# Patient Record
Sex: MALE | Race: WHITE | NOT HISPANIC OR LATINO | Employment: OTHER | URBAN - METROPOLITAN AREA
[De-identification: names, ages, dates, MRNs, and addresses within clinical notes are randomized per-mention and may not be internally consistent; named-entity substitution may affect disease eponyms.]

---

## 2018-12-29 ENCOUNTER — APPOINTMENT (EMERGENCY)
Dept: CT IMAGING | Facility: HOSPITAL | Age: 73
End: 2018-12-29
Payer: COMMERCIAL

## 2018-12-29 ENCOUNTER — HOSPITAL ENCOUNTER (EMERGENCY)
Facility: HOSPITAL | Age: 73
Discharge: HOME | End: 2018-12-29
Attending: EMERGENCY MEDICINE | Admitting: EMERGENCY MEDICINE
Payer: COMMERCIAL

## 2018-12-29 VITALS
RESPIRATION RATE: 20 BRPM | WEIGHT: 242 LBS | OXYGEN SATURATION: 98 % | BODY MASS INDEX: 29.47 KG/M2 | DIASTOLIC BLOOD PRESSURE: 71 MMHG | SYSTOLIC BLOOD PRESSURE: 126 MMHG | HEIGHT: 76 IN | HEART RATE: 72 BPM | TEMPERATURE: 98 F

## 2018-12-29 DIAGNOSIS — H57.11 EYE PAIN, RIGHT: Primary | ICD-10-CM

## 2018-12-29 DIAGNOSIS — H10.9 CONJUNCTIVITIS: ICD-10-CM

## 2018-12-29 DIAGNOSIS — H53.8 BLURRY VISION: ICD-10-CM

## 2018-12-29 PROCEDURE — 70450 CT HEAD/BRAIN W/O DYE: CPT

## 2018-12-29 PROCEDURE — 99283 EMERGENCY DEPT VISIT LOW MDM: CPT

## 2018-12-29 RX ORDER — OFLOXACIN 3 MG/ML
1 SOLUTION/ DROPS OPHTHALMIC ONCE
Status: COMPLETED | OUTPATIENT
Start: 2018-12-29 | End: 2018-12-29

## 2018-12-29 RX ADMIN — FLUORESCEIN SODIUM 1 STRIP: 1 STRIP OPHTHALMIC at 21:13

## 2018-12-29 RX ADMIN — OFLOXACIN 1 DROP: 3 SOLUTION/ DROPS OPHTHALMIC at 21:12

## 2018-12-29 NOTE — ED PROVIDER NOTES
History  Chief Complaint   Patient presents with    Eye Pain     patient is c/o right eye pain since yesterday  slight blurred vision  states that he feels pressure, not sure if there's a foreign body  denies headache  denies eye discharge  Mild aching R eye discomfort since yesterday  No itching or drainage  No trauma  + visual disturbance in R eye only since yesterday  No h/o similar sxs  No HA  No neck pain  No numbness or weakness or dysarthria  No other sxs or concerns at present  No aggravating or alleviating factors  None       Past Medical History:   Diagnosis Date    Diabetes mellitus ([facility] )     Myocardial infarction ([facility] )     Parkinson disease ([facility] )        Past Surgical History:   Procedure Laterality Date    CARDIAC SURGERY         History reviewed  No pertinent family history  I have reviewed and agree with the history as documented  Social History   Substance Use Topics    Smoking status: Never Smoker    Smokeless tobacco: Never Used    Alcohol use No        Review of Systems   Constitutional: Negative for chills and fever  Eyes: Positive for pain, redness and visual disturbance  Negative for photophobia, discharge and itching  Neurological: Negative for dizziness, tremors, seizures, syncope, facial asymmetry, speech difficulty, weakness, light-headedness, numbness and headaches  All other systems reviewed and are negative  Physical Exam  Physical Exam   Constitutional: He is oriented to person, place, and time  He appears well-developed and well-nourished  No distress  HENT:   Head: Normocephalic and atraumatic  Eyes: EOM are normal  Right eye exhibits no discharge  Left eye exhibits no discharge  No scleral icterus  R eye conjunctival injection  No proptosis or chemosis  No APD  Normal EOMs  Grossly normal anterior chamber depth  Normal accommodation  No fluoroscein uptake  R IOP via icare 20mmhg  Neck: Normal range of motion  Neck supple   No JVD present  Pulmonary/Chest: Effort normal  No stridor  Musculoskeletal: Normal range of motion  He exhibits no edema, tenderness or deformity  Neurological: He is alert and oriented to person, place, and time  No cranial nerve deficit or sensory deficit  He exhibits abnormal muscle tone  Coordination abnormal    Skin: Skin is warm and dry  He is not diaphoretic  Vital Signs  ED Triage Vitals [12/29/18 1732]   Temperature Pulse Respirations Blood Pressure SpO2   98 °F (36 7 °C) 72 16 132/60 99 %      Temp Source Heart Rate Source Patient Position - Orthostatic VS BP Location FiO2 (%)   Oral Monitor Sitting Right arm --      Pain Score       5           Vitals:    12/29/18 1732 12/29/18 2055   BP: 132/60 126/71   Pulse: 72 72   Patient Position - Orthostatic VS: Sitting Sitting       Visual Acuity      ED Medications  Medications   fluorescein sodium sterile ophthalmic strip 1 strip (1 strip Right Eye Given by Other 12/29/18 2113)   ofloxacin (OCUFLOX) 0 3 % ophthalmic solution 1 drop (1 drop Right Eye Given 12/29/18 2112)       Diagnostic Studies  Results Reviewed     None                 CT head without contrast   Final Result by Yovanny Marcus MD (12/29 2105)      No acute intracranial abnormality  Workstation performed: [id number]                    Procedures  Procedures       Phone Contacts  ED Phone Contact    ED Course                               MDM  Number of Diagnoses or Management Options  Conjunctivitis:   Eye pain, right:   Diagnosis management comments: Acute R eye visual disturbance and eye pain, unclear etiology  Normal IOP  No FB or corneal abrasion  CT head without intracranial process/cva  Plan - d/c home, f/u [facility] w/i next 2d or rted if worsening sxs or new concerns          Amount and/or Complexity of Data Reviewed  Clinical lab tests: reviewed and ordered  Tests in the radiology section of CPT®: ordered and reviewed  Tests in the medicine section of CPT®: ordered and reviewed  Obtain history from someone other than the patient: yes  Independent visualization of images, tracings, or specimens: yes      CritCare Time    Disposition  Final diagnoses:   Eye pain, right   Conjunctivitis   Blurry vision     Time reflects when diagnosis was documented in both MDM as applicable and the Disposition within this note     Time User Action Codes Description Comment    12/29/2018  9:11 PM Ida Sin [H57 11] Eye pain, right     12/29/2018  9:11 PM Ida Sin [H10 9] Conjunctivitis     12/30/2018 10:10 PM Ida Sin [H53 8] Blurry vision       ED Disposition     ED Disposition Condition Comment    Discharge  Jolene Villalobos discharge to home/self care  Condition at discharge: Stable        Follow-up Information    None         There are no discharge medications for this patient  No discharge procedures on file      ED Provider  Electronically Signed by           Kami Novoa MD  12/30/18 8969

## 2018-12-30 NOTE — DISCHARGE INSTRUCTIONS
